# Patient Record
Sex: FEMALE | Race: WHITE | Employment: UNEMPLOYED | URBAN - METROPOLITAN AREA
[De-identification: names, ages, dates, MRNs, and addresses within clinical notes are randomized per-mention and may not be internally consistent; named-entity substitution may affect disease eponyms.]

---
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
## 2025-01-09 LAB — BODY SURFACE AREA: 1.57 M2

## 2025-01-09 PROCEDURE — 93244 EXT ECG>48HR<7D REV&INTERPJ: CPT | Performed by: STUDENT IN AN ORGANIZED HEALTH CARE EDUCATION/TRAINING PROGRAM

## 2025-01-10 ENCOUNTER — TELEPHONE (OUTPATIENT)
Dept: PEDIATRIC CARDIOLOGY | Facility: HOSPITAL | Age: 20
End: 2025-01-10
Payer: COMMERCIAL

## 2025-01-10 ENCOUNTER — DOCUMENTATION (OUTPATIENT)
Dept: PEDIATRIC CARDIOLOGY | Facility: HOSPITAL | Age: 20
End: 2025-01-10
Payer: COMMERCIAL

## 2025-01-10 NOTE — TELEPHONE ENCOUNTER
"01/10/25 at 9:28 AM     Spoke with: Patient's mother   [phone]     Mom called stating that Belle is still experiencing daily dizziness and near-syncope. Mom stated that she has also felt very fatigued and is still experiencing chest pain. Mom said that Dr. Larose mentioned POTS at one of their appointments and she was wondering if there was any medication that would help Belle's symptoms.   Belle is drinking 100 ounces +of water daily as well as Gatorade. She is scheduled with pulmonology later this month and mom will talk to the PCP about a referral to neurology.    I informed mom I will let Dr. Larose know this information, and call her back with an update. Confirmed understanding, no further questions or issues to be addressed. Encouraged reaching out if there was anything else needed.   Provided contact info for pediatric cardiology program.    - Nasir Love RN  [phone]       01/10/25 at 9:38 AM     Spoke with: Patient's mother   [phone]     Per Dr. Larose, \"I have a low index of suspicion for cardiac etiology of symptoms, given normal holter, normal EKG, normal CPET, and normal echo. Her orthostatic vital signs at her visit on 12/16/24 did not meet diagnostic criteria for POTS, however family would like evaluation for POTS. I recommend follow up appointment with pediatric cardiology if symptoms persist once she has been evaluated by neurology for her dizziness, and pulmonology for the spirometry results (premature glottic closure).\" Mom stated she will call us back once they have an appointment scheduled with neurology.     Confirmed understanding, no further questions or issues to be addressed. Encouraged reaching out if there was anything else needed.   Provided contact info for pediatric cardiology program.    - Nasir Love RN  [phone]     "

## 2025-01-10 NOTE — PROGRESS NOTES
Received patient message that Belle Holliday still has dizziness despite normal holter and normal CPET. Per message received from nursing team, patient  has appointment with pulmonology for spirometry results. I have a low index of suspicion for cardiac etiology of symptoms, given normal holter, normal EKG, normal CPET, and normal echo. Her orthostatic vital signs at her visit on 12/16/24 did not meet diagnostic criteria for POTS, however family would like evaluation for POTS. I recommend follow up appointment with pediatric cardiology if symptoms persist once she has been evaluated by neurology for her dizziness, and pulmonology for the spirometry results (premature glottic closure).    Froy Larose MD

## 2025-01-10 NOTE — TELEPHONE ENCOUNTER
01/10/25 at 8:16 AM   Attempted to contact: Patient's mother   [phone]     Call went to voicemail, left message without any identifying PHI leaving normal holter monitor results and provided contact info for pediatric cardiology.    - Nasir Love RN  [phone]

## 2025-01-23 ENCOUNTER — APPOINTMENT (OUTPATIENT)
Dept: PULMONOLOGY | Facility: HOSPITAL | Age: 20
End: 2025-01-23
Payer: COMMERCIAL

## 2025-01-29 ENCOUNTER — OFFICE VISIT (OUTPATIENT)
Dept: PULMONOLOGY | Facility: HOSPITAL | Age: 20
End: 2025-01-29
Payer: COMMERCIAL

## 2025-01-29 VITALS
DIASTOLIC BLOOD PRESSURE: 67 MMHG | SYSTOLIC BLOOD PRESSURE: 109 MMHG | RESPIRATION RATE: 16 BRPM | OXYGEN SATURATION: 97 % | HEART RATE: 84 BPM | HEIGHT: 66 IN | BODY MASS INDEX: 20.25 KG/M2 | WEIGHT: 126 LBS | TEMPERATURE: 98 F

## 2025-01-29 DIAGNOSIS — R06.02 SHORTNESS OF BREATH: Primary | ICD-10-CM

## 2025-01-29 DIAGNOSIS — R94.2 ABNORMAL PFT: ICD-10-CM

## 2025-01-29 PROCEDURE — 99214 OFFICE O/P EST MOD 30 MIN: CPT | Performed by: NURSE PRACTITIONER

## 2025-01-29 PROCEDURE — 3008F BODY MASS INDEX DOCD: CPT | Performed by: NURSE PRACTITIONER

## 2025-01-29 PROCEDURE — 1036F TOBACCO NON-USER: CPT | Performed by: NURSE PRACTITIONER

## 2025-01-29 PROCEDURE — 99204 OFFICE O/P NEW MOD 45 MIN: CPT | Performed by: NURSE PRACTITIONER

## 2025-01-29 RX ORDER — ALBUTEROL SULFATE 90 UG/1
2 INHALANT RESPIRATORY (INHALATION) EVERY 4 HOURS PRN
Qty: 18 G | Refills: 11 | Status: SHIPPED | OUTPATIENT
Start: 2025-01-29

## 2025-01-29 ASSESSMENT — ENCOUNTER SYMPTOMS
WHEEZING: 0
UNEXPECTED WEIGHT CHANGE: 0
CHEST TIGHTNESS: 1
FATIGUE: 0
RHINORRHEA: 0
SHORTNESS OF BREATH: 1
FEVER: 0
CHILLS: 0
COUGH: 0

## 2025-01-29 NOTE — PATIENT INSTRUCTIONS
Start on albuterol 1-2 puffs as needed for shortness of breath or chest tightness, you can use this every 4 hours if this helps.  Please get Chest X-ray done, I will call you with results.  I placed a referral to ENT for you.   Call with any questions or concerns.  Follow up with me in 4 months.

## 2025-01-29 NOTE — PROGRESS NOTES
Subjective   Patient ID: Belle Holliday is a 19 y.o. female who presents for NPV for chest pain/sob.    HPI: Patient has no significant PMH who was referred by cardiology for shortness of breath and chest tightness. She states that it has been going on for a few months now, they think early November. She denies being sick at the time. She states that she is short of breath at all times worse going up and down stairs. She states that she has constant chest pain. Nothing that she notices makes her symptoms better. Per chart note she had long COVID symptoms in the past and she had CXRs done at that time which were normal. She is also experiencing dizziness often. Cardiology workup was normal so she is now going to see neurology for symptoms. She states she feels like she has to take a deep breath at times. She denies any cough or wheezing. She gets only on occasional seasonal allergy symptoms. She had spirometry done with cardiology. She does have a family history of asthma. She has never smoked and denies second hand smoke exposure. She denies any anxiety symptoms. She has seen ENT when she was little, her mother states that she had ear tubes.     Review of Systems   Constitutional:  Negative for chills, fatigue, fever and unexpected weight change.        Dizziness    HENT:  Negative for congestion, postnasal drip and rhinorrhea.    Respiratory:  Positive for chest tightness and shortness of breath. Negative for cough (denies hemoptysis.) and wheezing.    Cardiovascular:  Negative for chest pain and leg swelling.   All other systems reviewed and are negative.      Objective   Physical Exam  Vitals reviewed.   Constitutional:       Appearance: Normal appearance.   HENT:      Head: Normocephalic.   Cardiovascular:      Rate and Rhythm: Normal rate and regular rhythm.   Pulmonary:      Effort: Pulmonary effort is normal.      Breath sounds: Normal breath sounds.   Skin:     General: Skin is warm and dry.   Neurological:       Mental Status: She is alert.         Assessment/Plan   Shortness of breath  Chest pain  Dizziness  Premature glottic closure     Plan:    -Patient had CPET stress test done before and after exercise. The test did not meet ATS criteria since FEV1 was not repeatable but on review FEV1 was normal at 98 and there was no obstruction. Discussed results with patient this portion was normal. She did have evidence of premature glottic closure and flattening of the inspiratory flow volume loop. I discussed results with patient and her mother and discussed she will need ENT evaluation for findings of premature glottic closure on PFTs. I placed an ENT referral they are possibly going to see ENT outside of  that is closer to their home.   -She has shortness of breath and chest pain that she describes as constant. I discussed with patient and her mother that although her spirometry was normal I still recommend at least a trial of albuterol prn. I ordered this and educated her on use. She denies cough or wheezing but does have a family history of asthma. This would still not explain her dizziness so still encouraged her to be seen by neurology. If albuterol is helping chest pain/SOB then I instructed her to call sooner and we will consider long acting maintenance therapy for asthma. She is agreeable to this.   -I will get an updated CXR today since shortness of breath chest pain started November 2024 and last CXR was normal in 2021.     Overall we discussed testing results. I will refer her to ENT but also trial an albuterol inhaler for her. I will bring her back with me in 4 months for follow up but instructed her to call me sooner with any questions or concerns.     Total time:  45 min.

## 2025-04-24 ENCOUNTER — APPOINTMENT (OUTPATIENT)
Facility: CLINIC | Age: 20
End: 2025-04-24
Payer: COMMERCIAL

## 2025-05-07 ENCOUNTER — APPOINTMENT (OUTPATIENT)
Dept: ULTRASOUND IMAGING | Age: 20
End: 2025-05-07
Payer: COMMERCIAL

## 2025-05-07 ENCOUNTER — HOSPITAL ENCOUNTER (EMERGENCY)
Age: 20
Discharge: HOME | End: 2025-05-07
Payer: COMMERCIAL

## 2025-05-07 ENCOUNTER — APPOINTMENT (OUTPATIENT)
Dept: CT IMAGING | Age: 20
End: 2025-05-07
Payer: COMMERCIAL

## 2025-05-07 VITALS
BODY MASS INDEX: 19.29 KG/M2 | SYSTOLIC BLOOD PRESSURE: 113 MMHG | RESPIRATION RATE: 16 BRPM | OXYGEN SATURATION: 100 % | TEMPERATURE: 98.2 F | HEIGHT: 66 IN | DIASTOLIC BLOOD PRESSURE: 77 MMHG | WEIGHT: 120 LBS | HEART RATE: 62 BPM

## 2025-05-07 DIAGNOSIS — R10.32 LEFT LOWER QUADRANT ABDOMINAL PAIN: Primary | ICD-10-CM

## 2025-05-07 DIAGNOSIS — R82.998 CASTS PRESENT IN URINE: ICD-10-CM

## 2025-05-07 LAB
ALBUMIN SERPL-MCNC: 4.6 G/DL (ref 3.5–5.2)
ALP SERPL-CCNC: 74 U/L (ref 35–104)
ALT SERPL-CCNC: 14 U/L (ref 0–32)
ANION GAP SERPL CALCULATED.3IONS-SCNC: 9 MMOL/L (ref 7–16)
AST SERPL-CCNC: 17 U/L (ref 0–31)
BACTERIA URNS QL MICRO: ABNORMAL
BASOPHILS # BLD: 0.02 K/UL (ref 0–0.2)
BASOPHILS NFR BLD: 0 % (ref 0–2)
BILIRUB SERPL-MCNC: 0.5 MG/DL (ref 0–1.2)
BILIRUB UR QL STRIP: NEGATIVE
BUN SERPL-MCNC: 9 MG/DL (ref 6–20)
CALCIUM SERPL-MCNC: 10 MG/DL (ref 8.6–10.2)
CASTS #/AREA URNS LPF: ABNORMAL /LPF
CHLORIDE SERPL-SCNC: 106 MMOL/L (ref 98–107)
CLARITY UR: CLEAR
CO2 SERPL-SCNC: 27 MMOL/L (ref 22–29)
COLOR UR: YELLOW
CREAT SERPL-MCNC: 0.8 MG/DL (ref 0.5–1)
EOSINOPHIL # BLD: 0.05 K/UL (ref 0.05–0.5)
EOSINOPHILS RELATIVE PERCENT: 1 % (ref 0–6)
EPI CELLS #/AREA URNS HPF: ABNORMAL /HPF
ERYTHROCYTE [DISTWIDTH] IN BLOOD BY AUTOMATED COUNT: 12.4 % (ref 11.5–15)
GFR, ESTIMATED: >90 ML/MIN/1.73M2
GLUCOSE SERPL-MCNC: 90 MG/DL (ref 74–99)
GLUCOSE UR STRIP-MCNC: NEGATIVE MG/DL
HCG, URINE, POC: NEGATIVE
HCG, URINE, POC: NEGATIVE
HCT VFR BLD AUTO: 44.3 % (ref 34–48)
HGB BLD-MCNC: 13.7 G/DL (ref 11.5–15.5)
HGB UR QL STRIP.AUTO: ABNORMAL
IMM GRANULOCYTES # BLD AUTO: <0.03 K/UL (ref 0–0.58)
IMM GRANULOCYTES NFR BLD: 0 % (ref 0–5)
KETONES UR STRIP-MCNC: NEGATIVE MG/DL
LACTATE BLDV-SCNC: 0.6 MMOL/L (ref 0.5–2.2)
LEUKOCYTE ESTERASE UR QL STRIP: ABNORMAL
LIPASE SERPL-CCNC: 31 U/L (ref 13–60)
LYMPHOCYTES NFR BLD: 1.92 K/UL (ref 1.5–4)
LYMPHOCYTES RELATIVE PERCENT: 32 % (ref 20–42)
Lab: NORMAL
Lab: NORMAL
MCH RBC QN AUTO: 28.7 PG (ref 26–35)
MCHC RBC AUTO-ENTMCNC: 30.9 G/DL (ref 32–34.5)
MCV RBC AUTO: 92.9 FL (ref 80–99.9)
MONOCYTES NFR BLD: 0.51 K/UL (ref 0.1–0.95)
MONOCYTES NFR BLD: 8 % (ref 2–12)
NEGATIVE QC PASS/FAIL: NORMAL
NEGATIVE QC PASS/FAIL: NORMAL
NEUTROPHILS NFR BLD: 59 % (ref 43–80)
NEUTS SEG NFR BLD: 3.54 K/UL (ref 1.8–7.3)
NITRITE UR QL STRIP: NEGATIVE
PH UR STRIP: 7 [PH] (ref 5–8)
PLATELET # BLD AUTO: 249 K/UL (ref 130–450)
PMV BLD AUTO: 10.3 FL (ref 7–12)
POSITIVE QC PASS/FAIL: NORMAL
POSITIVE QC PASS/FAIL: NORMAL
POTASSIUM SERPL-SCNC: 4.1 MMOL/L (ref 3.5–5)
PROT SERPL-MCNC: 7.5 G/DL (ref 6.4–8.3)
PROT UR STRIP-MCNC: NEGATIVE MG/DL
RBC # BLD AUTO: 4.77 M/UL (ref 3.5–5.5)
RBC #/AREA URNS HPF: ABNORMAL /HPF
SODIUM SERPL-SCNC: 142 MMOL/L (ref 132–146)
SP GR UR STRIP: 1.01 (ref 1–1.03)
UROBILINOGEN UR STRIP-ACNC: 0.2 EU/DL (ref 0–1)
WBC #/AREA URNS HPF: ABNORMAL /HPF
WBC OTHER # BLD: 6.1 K/UL (ref 4.5–11.5)

## 2025-05-07 PROCEDURE — 2580000003 HC RX 258: Performed by: PHYSICIAN ASSISTANT

## 2025-05-07 PROCEDURE — 93975 VASCULAR STUDY: CPT

## 2025-05-07 PROCEDURE — 6360000002 HC RX W HCPCS: Performed by: PHYSICIAN ASSISTANT

## 2025-05-07 PROCEDURE — 83690 ASSAY OF LIPASE: CPT

## 2025-05-07 PROCEDURE — 96374 THER/PROPH/DIAG INJ IV PUSH: CPT

## 2025-05-07 PROCEDURE — 74177 CT ABD & PELVIS W/CONTRAST: CPT

## 2025-05-07 PROCEDURE — 81001 URINALYSIS AUTO W/SCOPE: CPT

## 2025-05-07 PROCEDURE — 85025 COMPLETE CBC W/AUTO DIFF WBC: CPT

## 2025-05-07 PROCEDURE — 76856 US EXAM PELVIC COMPLETE: CPT

## 2025-05-07 PROCEDURE — 6360000004 HC RX CONTRAST MEDICATION: Performed by: RADIOLOGY

## 2025-05-07 PROCEDURE — 99285 EMERGENCY DEPT VISIT HI MDM: CPT

## 2025-05-07 PROCEDURE — 87086 URINE CULTURE/COLONY COUNT: CPT

## 2025-05-07 PROCEDURE — 80053 COMPREHEN METABOLIC PANEL: CPT

## 2025-05-07 PROCEDURE — 83605 ASSAY OF LACTIC ACID: CPT

## 2025-05-07 RX ORDER — 0.9 % SODIUM CHLORIDE 0.9 %
1000 INTRAVENOUS SOLUTION INTRAVENOUS ONCE
Status: COMPLETED | OUTPATIENT
Start: 2025-05-07 | End: 2025-05-07

## 2025-05-07 RX ORDER — KETOROLAC TROMETHAMINE 30 MG/ML
30 INJECTION, SOLUTION INTRAMUSCULAR; INTRAVENOUS ONCE
Status: COMPLETED | OUTPATIENT
Start: 2025-05-07 | End: 2025-05-07

## 2025-05-07 RX ORDER — IOPAMIDOL 755 MG/ML
75 INJECTION, SOLUTION INTRAVASCULAR
Status: COMPLETED | OUTPATIENT
Start: 2025-05-07 | End: 2025-05-07

## 2025-05-07 RX ORDER — NAPROXEN 500 MG/1
500 TABLET ORAL 2 TIMES DAILY
Qty: 60 TABLET | Refills: 0 | Status: SHIPPED | OUTPATIENT
Start: 2025-05-07

## 2025-05-07 RX ADMIN — IOPAMIDOL 75 ML: 755 INJECTION, SOLUTION INTRAVENOUS at 14:20

## 2025-05-07 RX ADMIN — SODIUM CHLORIDE 1000 ML: 0.9 INJECTION, SOLUTION INTRAVENOUS at 10:13

## 2025-05-07 RX ADMIN — KETOROLAC TROMETHAMINE 30 MG: 30 INJECTION, SOLUTION INTRAMUSCULAR at 10:14

## 2025-05-07 ASSESSMENT — PAIN SCALES - GENERAL
PAINLEVEL_OUTOF10: 8
PAINLEVEL_OUTOF10: 8

## 2025-05-07 ASSESSMENT — LIFESTYLE VARIABLES
HOW OFTEN DO YOU HAVE A DRINK CONTAINING ALCOHOL: NEVER
HOW MANY STANDARD DRINKS CONTAINING ALCOHOL DO YOU HAVE ON A TYPICAL DAY: PATIENT DOES NOT DRINK

## 2025-05-07 ASSESSMENT — PAIN DESCRIPTION - LOCATION
LOCATION: ABDOMEN
LOCATION: ABDOMEN

## 2025-05-07 ASSESSMENT — PAIN DESCRIPTION - ORIENTATION: ORIENTATION: LEFT

## 2025-05-07 ASSESSMENT — PAIN DESCRIPTION - DESCRIPTORS
DESCRIPTORS: ACHING
DESCRIPTORS: DULL;SHARP

## 2025-05-07 ASSESSMENT — PAIN DESCRIPTION - FREQUENCY: FREQUENCY: CONTINUOUS

## 2025-05-07 ASSESSMENT — PAIN - FUNCTIONAL ASSESSMENT: PAIN_FUNCTIONAL_ASSESSMENT: 0-10

## 2025-05-07 NOTE — ED PROVIDER NOTES
Independent [facility] Visit.           Department of Emergency Medicine   ED  Provider Note  Admit Date/RoomTime: 5/7/2025  9:04 AM  ED Room: ROB/ROB    Chief Complaint       Abdominal Pain (LLQ pain x 2 days. Denies N/V/D)    History of Present Illness      Zaynab Nguyễn is a 19 y.o. old female who presents to the ED with left-sided abdominal pain that began yesterday.  She denies any nausea, vomiting, diarrhea, or urinary complaints.  Patient denies any personal history of kidney stones or diverticulitis.  Mother, at bedside, states these do run in the family.  Patient denies abnormal vaginal bleeding/discharge or irritation.  She denies any concern for pregnancy.  Patient has not had any abdominal surgeries.  She states the pain is worse with movement and straightening her leg.  She denies any known history of ovarian cysts.  Patient is alert and oriented x 3 and in no apparent distress at this exam.  She is nontoxic-appearing.  Patient denies any possibility or concern for STDs.    PCP: Rian Aragon DO ROS   Pertinent positives and negatives are stated within HPI, all other systems reviewed and are negative.    Past Medical History:  has a past medical history of Chronic serous otitis media, Eczema, Migraine, and Mood disorder.    Past Surgical History:  has a past surgical history that includes Myringotomy Tympanostomy Tube Placement; Myringotomy Tympanostomy Tube Placement (Left, 03/10/2017); and Adenoidectomy.    Social History:  reports that she has never smoked. She has never used smokeless tobacco. She reports that she does not drink alcohol and does not use drugs.    Family History: family history is not on file.     The patient’s home medications have been reviewed.    Allergies: Strawberry (diagnostic)  Allergies have been reviewed with patient.     Physical Exam   VS:  /77   Pulse 62   Temp 98.2 °F (36.8 °C) (Oral)   Resp 16   Ht 1.676 m (5' 6\")   Wt 54.4 kg (120 lb)   SpO2 100%   BMI

## 2025-05-08 LAB
MICROORGANISM SPEC CULT: ABNORMAL
MICROORGANISM SPEC CULT: ABNORMAL
SERVICE CMNT-IMP: ABNORMAL
SPECIMEN DESCRIPTION: ABNORMAL

## 2025-05-09 ENCOUNTER — RESULTS FOLLOW-UP (OUTPATIENT)
Dept: EMERGENCY DEPT | Age: 20
End: 2025-05-09

## 2025-05-28 ENCOUNTER — APPOINTMENT (OUTPATIENT)
Dept: PULMONOLOGY | Facility: HOSPITAL | Age: 20
End: 2025-05-28
Payer: COMMERCIAL

## 2025-06-06 ENCOUNTER — TELEMEDICINE (OUTPATIENT)
Dept: NEUROLOGY | Facility: HOSPITAL | Age: 20
End: 2025-06-06
Payer: COMMERCIAL

## 2025-06-06 DIAGNOSIS — G43.009 MIGRAINE WITHOUT AURA AND WITHOUT STATUS MIGRAINOSUS, NOT INTRACTABLE: Primary | ICD-10-CM

## 2025-06-06 DIAGNOSIS — R42 DIZZINESS: ICD-10-CM

## 2025-06-06 PROCEDURE — 1036F TOBACCO NON-USER: CPT | Performed by: PSYCHIATRY & NEUROLOGY

## 2025-06-06 PROCEDURE — 99204 OFFICE O/P NEW MOD 45 MIN: CPT | Performed by: PSYCHIATRY & NEUROLOGY

## 2025-06-06 RX ORDER — SUMATRIPTAN SUCCINATE 50 MG/1
50 TABLET ORAL ONCE AS NEEDED
Qty: 9 TABLET | Refills: 1 | Status: SHIPPED | OUTPATIENT
Start: 2025-06-06 | End: 2025-07-06

## 2025-06-06 NOTE — PROGRESS NOTES
"Telehealth visit    Visit type: provider referral: Dr Froy Larose    PCP: Daniel Partida DO.    Subjective     Belle Holliday is a 19 y.o. year old right handed female who presents with Headache.    Patient is accompanied by none.     Telehealth visit today. The patient was informed about the telehealth clinical encounter including benefits to avoiding travel, limitations of the assessment, and billing for the service. In-office care may be recommended if needed. Telehealth sessions are not being recorded and personal health information is protected. All questions were answered and verbal consent from the patient (or guardian) was obtained to proceed. Patient verbally confirmed, patient physically in [place].     HPI    Vague historian    Initially denied other medical hx except for migraines. Later stated has long COVID \"couple years ago\". 2022.    Scheduled for evaluation of dizziness and HA, referred by pediatric cardiology.    Has multiple symptoms. Having CP, has trouble waking up, sleeping for so long, dizziness and HA. Started Nov 2024. Was advised to see neurologist.    Dizziness--ONLY with change in position in vertical plane. Happens with sitting to standing, sitting to laying down gets dizzy as well. Not spinning. Feels like lightheadedness--will pass out. Not imbalance. Not happening as much anymore. Was happening a lot before. She states she is keeping hydrated. Mentioned to cardiologist--did exercise test and holter monitor which were per report normal. No tilt table test.    HA--was all day, everyday. Better now for some reason. Happening about once a week these days. Random onset. Could be just doing anything. Starts with CP, sits down, then grabs a drink. Hurts in front and on the sides of head. In the past, also in back of head. Throbbing, feels heavy. (+) some sensitivity to light>>sound. (-) nausea/vomit. HA lasts all through CP. Recently HA improved for some reason, not necessarily " related to school end. School college ended May 10, 2025. HA lasts anywhere fro 15 minutes to few hours. Used to take Tylenol, not helping. Ibuprofen sometimes helps. Has not tried triptans.    Was seen by pediatric cardiologist. ? POTS entertained. Referred to neurology for dizziness.    Was being seen at [facility] in the past. Pt states she was on magnesium, was on vit B2, was on Zofran. Was on topiramate. Meds were stopped as she didn't need them anymore. Used to have migraines. Grew out of them.    10/2021 Head CT wo (report only, no images): normal    No sz. No significant head injury. No LOC. Doing well in school.    Not sexually active per pt.    No smoking. No alcohol. No street drug use.      Review of Systems   Constitutional:  Negative for appetite change, chills and fever.   HENT:  Negative for ear pain and nosebleeds.    Eyes:  Negative for discharge and itching.   Respiratory:  Negative for choking and chest tightness.    Cardiovascular:  Negative for palpitations.   Gastrointestinal:  Negative for abdominal distention, abdominal pain and nausea.   Endocrine: Negative for cold intolerance and heat intolerance.   Genitourinary:  Negative for difficulty urinating and dysuria.   Musculoskeletal:  Negative for gait problem and myalgias.   Neurological:  Negative for seizures and numbness.     Problem List[1]    Medical History[2]  Surgical History[3]  Social History     Tobacco Use    Smoking status: Never     Passive exposure: Never    Smokeless tobacco: Never   Substance Use Topics    Alcohol use: Never     family history is not on file.    Allergies[4]  Current Medications[5]    Objective     Vital Signs:  None--telehealth visit    Awake, alert, oriented x3, in no distress  Well-nourished, seated    Mental status exam as above, conversant   Fair fund of knowledge  Recent/remote memory fair  Fair attention span  Full EOMs intact, no nystagmus, no ptosis   No facial droop   Hearing grossly intact    No dysarthria    Motor strength is at least antigravity on all extremities   Finger to nose test intact bilaterally   Negative Romberg sign   I did not have her walk      Lab Results   Component Value Date    WBC 7.4 12/17/2021    RBC 4.51 12/17/2021    HGB 13.3 12/17/2021    HCT 41.3 12/17/2021     12/17/2021    VITD25 32 12/17/2021    TSH 0.64 12/17/2021        Assessment/Plan     Dizziness, positional  Only in positional change in vertical plane  Sounds more likely BP/heart-related  Cardiologist suspicious for poss POTS    2.    Episodic migraine without aura  Taken singularly, her HA symptomatology is compatible with episodic migraine. She does have stated and documented hx of such.  Her migraine condition WILL NOT explain her other symptoms, however  10/2021 Head CT wo report normal  Discussed non-drug therapy including stress reduction, identification and avoidance as much as possible of precipitating factors. Avoid smoking/alcohol intake. Try to get good night sleep.   The current headache abortive medication regimen seem to be helping occasionally (ibuprofen)  Pt currently has about once a week HA  S/p topiramate--didn't have problems in past--stopped as deemed not needing anymore  We discussed about considering as needed migraine therapy. She has not been on triptans. She does have chest pain which has been deemed to be non-cardiac  Discussed poss use of sumatriptan. Correct way of administering discussed. Poss SE discussed, including the fact it may cause chest pains (among others) in some patients.  We also discussed alternative options including Nurtec and/or Ubrelvy prn.   If/when her HA worsen/become more frequent, something like beta blocker may be considered mikki if no contraindication (and mikki if patient does get diagnosed to have POTS)  The patient has been advised to limit NSAID and analgesic use to no more than 3 times a week because of risk of analgesic overuse headache.    Also  discussed about potential reproductive and fetal effects of antiepileptic drug use in a reproductive age woman, and the patient understands and accepts the risk.    3. Hx long COVID syndrome, as stated    Discussed, evaluation/management of possible POTS is beyond the scope of my practice. I will defer this to her cardiologist and/or pediatrician.    Plans:  Episodic migraine without aura--try sumatriptan 50mg prn  Positional dizziness sounds possibly BP/heart-related  I do not evaluate for, nor manage, possible POTS--will defer to PCP/cardiologist  If need symptomatic medication mikki if/when diagnosed with POTS, something like beta blocker may also help with migraine symptoms (as a preventative)    Rtc 2-3 mo    All questions were answered.  Pt knows how to contact my office in case pt has any questions or concerns.    Aba Ott MD            [1] There is no problem list on file for this patient.   [2]   Past Medical History:  Diagnosis Date    Migraines    [3] History reviewed. No pertinent surgical history.  [4] No Known Allergies  [5]   Current Outpatient Medications:     albuterol 90 mcg/actuation inhaler, Inhale 2 puffs every 4 hours if needed for shortness of breath. (Patient not taking: Reported on 6/6/2025), Disp: 18 g, Rfl: 11

## 2025-06-06 NOTE — LETTER
"June 6, 2025     Froy Larose MD  [id number]  Department Of Pediatrics-Cardiology  [place] [id number]    Patient: Belle Holliday   YOB: 2005   Date of Visit: 6/6/2025       Dear Dr. Froy Larose MD:    Thank you for referring Belle Holliday to me for evaluation. Below are my notes for this consultation.  If you have questions, please do not hesitate to call me. I look forward to following your patient along with you.       Sincerely,     Aba Ott MD      CC: Daniel aPrtida DO  ______________________________________________________________________________________    Telehealth visit    Visit type: provider referral: Dr Froy Larose    PCP: Daniel Partida DO.    Subjective     Belle Holliday is a 19 y.o. year old right handed female who presents with Headache.    Patient is accompanied by none.     Telehealth visit today. The patient was informed about the telehealth clinical encounter including benefits to avoiding travel, limitations of the assessment, and billing for the service. In-office care may be recommended if needed. Telehealth sessions are not being recorded and personal health information is protected. All questions were answered and verbal consent from the patient (or guardian) was obtained to proceed. Patient verbally confirmed, patient physically in [place].     HPI    Vague historian    Initially denied other medical hx except for migraines. Later stated has long COVID \"couple years ago\". 2022.    Scheduled for evaluation of dizziness and HA, referred by pediatric cardiology.    Has multiple symptoms. Having CP, has trouble waking up, sleeping for so long, dizziness and HA. Started Nov 2024. Was advised to see neurologist.    Dizziness--ONLY with change in position in vertical plane. Happens with sitting to standing, sitting to laying down gets dizzy as well. Not spinning. Feels like lightheadedness--will pass out. Not imbalance. Not happening as much " anymore. Was happening a lot before. She states she is keeping hydrated. Mentioned to cardiologist--did exercise test and holter monitor which were per report normal. No tilt table test.    HA--was all day, everyday. Better now for some reason. Happening about once a week these days. Random onset. Could be just doing anything. Starts with CP, sits down, then grabs a drink. Hurts in front and on the sides of head. In the past, also in back of head. Throbbing, feels heavy. (+) some sensitivity to light>>sound. (-) nausea/vomit. HA lasts all through CP. Recently HA improved for some reason, not necessarily related to school end. School college ended May 10, 2025. HA lasts anywhere fro 15 minutes to few hours. Used to take Tylenol, not helping. Ibuprofen sometimes helps. Has not tried triptans.    Was seen by pediatric cardiologist. ? POTS entertained. Referred to neurology for dizziness.    Was being seen at [facility] in the past. Pt states she was on magnesium, was on vit B2, was on Zofran. Was on topiramate. Meds were stopped as she didn't need them anymore. Used to have migraines. Grew out of them.    10/2021 Head CT wo (report only, no images): normal    No sz. No significant head injury. No LOC. Doing well in school.    Not sexually active per pt.    No smoking. No alcohol. No street drug use.      Review of Systems   Constitutional:  Negative for appetite change, chills and fever.   HENT:  Negative for ear pain and nosebleeds.    Eyes:  Negative for discharge and itching.   Respiratory:  Negative for choking and chest tightness.    Cardiovascular:  Negative for palpitations.   Gastrointestinal:  Negative for abdominal distention, abdominal pain and nausea.   Endocrine: Negative for cold intolerance and heat intolerance.   Genitourinary:  Negative for difficulty urinating and dysuria.   Musculoskeletal:  Negative for gait problem and myalgias.   Neurological:  Negative for seizures and numbness.     Problem  List[1]    Medical History[2]  Surgical History[3]  Social History     Tobacco Use   • Smoking status: Never     Passive exposure: Never   • Smokeless tobacco: Never   Substance Use Topics   • Alcohol use: Never     family history is not on file.    Allergies[4]  Current Medications[5]    Objective     Vital Signs:  None--telehealth visit    Awake, alert, oriented x3, in no distress  Well-nourished, seated    Mental status exam as above, conversant   Fair fund of knowledge  Recent/remote memory fair  Fair attention span  Full EOMs intact, no nystagmus, no ptosis   No facial droop   Hearing grossly intact   No dysarthria    Motor strength is at least antigravity on all extremities   Finger to nose test intact bilaterally   Negative Romberg sign   I did not have her walk      Lab Results   Component Value Date    WBC 7.4 12/17/2021    RBC 4.51 12/17/2021    HGB 13.3 12/17/2021    HCT 41.3 12/17/2021     12/17/2021    VITD25 32 12/17/2021    TSH 0.64 12/17/2021        Assessment/Plan     Dizziness, positional  Only in positional change in vertical plane  Sounds more likely BP/heart-related  Cardiologist suspicious for poss POTS    2.    Episodic migraine without aura  Taken singularly, her HA symptomatology is compatible with episodic migraine. She does have stated and documented hx of such.  Her migraine condition WILL NOT explain her other symptoms, however  10/2021 Head CT wo report normal  Discussed non-drug therapy including stress reduction, identification and avoidance as much as possible of precipitating factors. Avoid smoking/alcohol intake. Try to get good night sleep.   The current headache abortive medication regimen seem to be helping occasionally (ibuprofen)  Pt currently has about once a week HA  S/p topiramate--didn't have problems in past--stopped as deemed not needing anymore  We discussed about considering as needed migraine therapy. She has not been on triptans. She does have chest pain which  has been deemed to be non-cardiac  Discussed poss use of sumatriptan. Correct way of administering discussed. Poss SE discussed, including the fact it may cause chest pains (among others) in some patients.  We also discussed alternative options including Nurtec and/or Ubrelvy prn.   If/when her HA worsen/become more frequent, something like beta blocker may be considered mikki if no contraindication (and mikki if patient does get diagnosed to have POTS)  The patient has been advised to limit NSAID and analgesic use to no more than 3 times a week because of risk of analgesic overuse headache.    Also discussed about potential reproductive and fetal effects of antiepileptic drug use in a reproductive age woman, and the patient understands and accepts the risk.    3. Hx long COVID syndrome, as stated    Discussed, evaluation/management of possible POTS is beyond the scope of my practice. I will defer this to her cardiologist and/or pediatrician.    Plans:  Episodic migraine without aura--try sumatriptan 50mg prn  Positional dizziness sounds possibly BP/heart-related  I do not evaluate for, nor manage, possible POTS--will defer to PCP/cardiologist  If need symptomatic medication mikki if/when diagnosed with POTS, something like beta blocker may also help with migraine symptoms (as a preventative)    Rtc 2-3 mo    All questions were answered.  Pt knows how to contact my office in case pt has any questions or concerns.    Aba Ott MD            [1]  There is no problem list on file for this patient.   [2]  Past Medical History:  Diagnosis Date   • Migraines    [3]  History reviewed. No pertinent surgical history.  [4]  No Known Allergies  [5]    Current Outpatient Medications:   •  albuterol 90 mcg/actuation inhaler, Inhale 2 puffs every 4 hours if needed for shortness of breath. (Patient not taking: Reported on 6/6/2025), Disp: 18 g, Rfl: 11       [1]  There is no problem list on file for this patient.   [2]  Past Medical  History:  Diagnosis Date   • Migraines    [3]  History reviewed. No pertinent surgical history.  [4]  No Known Allergies  [5]    Current Outpatient Medications:   •  albuterol 90 mcg/actuation inhaler, Inhale 2 puffs every 4 hours if needed for shortness of breath. (Patient not taking: Reported on 6/6/2025), Disp: 18 g, Rfl: 11

## 2025-06-06 NOTE — PATIENT INSTRUCTIONS
Episodic migraine without aura--try sumatriptan 50mg prn  Positional dizziness sounds possibly BP/heart-related  I do not evaluate for, nor manage, possible POTS--will defer to PCP/cardiologist  If need symptomatic medication mikki if/when diagnosed with POTS, something like beta blocker may also help with migraine symptoms (as a preventative)    Rtc 2-3 mo

## 2025-07-08 ENCOUNTER — APPOINTMENT (OUTPATIENT)
Dept: NEUROLOGY | Facility: HOSPITAL | Age: 20
End: 2025-07-08
Payer: COMMERCIAL

## 2025-08-12 ENCOUNTER — TELEPHONE (OUTPATIENT)
Dept: ENT CLINIC | Age: 20
End: 2025-08-12

## 2025-10-01 ENCOUNTER — APPOINTMENT (OUTPATIENT)
Dept: NEUROLOGY | Facility: HOSPITAL | Age: 20
End: 2025-10-01
Payer: COMMERCIAL